# Patient Record
Sex: MALE | Race: WHITE | NOT HISPANIC OR LATINO | Employment: FULL TIME | URBAN - METROPOLITAN AREA
[De-identification: names, ages, dates, MRNs, and addresses within clinical notes are randomized per-mention and may not be internally consistent; named-entity substitution may affect disease eponyms.]

---

## 2024-08-02 ENCOUNTER — HOSPITAL ENCOUNTER (EMERGENCY)
Facility: HOSPITAL | Age: 49
Discharge: HOME | End: 2024-08-02
Attending: EMERGENCY MEDICINE
Payer: COMMERCIAL

## 2024-08-02 VITALS
SYSTOLIC BLOOD PRESSURE: 162 MMHG | HEART RATE: 93 BPM | WEIGHT: 181 LBS | RESPIRATION RATE: 16 BRPM | TEMPERATURE: 97.4 F | HEIGHT: 68 IN | OXYGEN SATURATION: 100 % | BODY MASS INDEX: 27.43 KG/M2 | DIASTOLIC BLOOD PRESSURE: 108 MMHG

## 2024-08-02 DIAGNOSIS — K60.2 RECTAL FISSURE: Primary | ICD-10-CM

## 2024-08-02 PROCEDURE — 99283 EMERGENCY DEPT VISIT LOW MDM: CPT

## 2024-08-02 RX ORDER — LIDOCAINE HYDROCHLORIDE 20 MG/ML
JELLY TOPICAL ONCE
Status: DISCONTINUED | OUTPATIENT
Start: 2024-08-02 | End: 2024-08-02 | Stop reason: SDUPTHER

## 2024-08-02 RX ORDER — LIDOCAINE HYDROCHLORIDE 20 MG/ML
JELLY TOPICAL ONCE
Status: COMPLETED | OUTPATIENT
Start: 2024-08-02 | End: 2024-08-02

## 2024-08-02 RX ADMIN — MINERAL OIL, PETROLATUM, PHENYLEPHRINE HCL 1 APPLICATION: 14; 74.9; .25 OINTMENT RECTAL at 22:50

## 2024-08-02 RX ADMIN — LIDOCAINE HYDROCHLORIDE: 20 JELLY TOPICAL at 22:50

## 2024-08-03 NOTE — ED PROVIDER NOTES
Subjective   History of Present Illness  Patient presents for evaluation of rectal plain and bleeding.  Patient states he is suffered from hemorrhoids and rectal fissures intermittently for few years.  He takes daily MiraLAX to help with constipation.  He was prescribed a suppository by primary nurse practitioner but his insurance did not cover it and it was $1500.  He states he is not having any rectal bleeding in between bowel movements.  He only notices blood on toilet paper when he wipes.  He is not having any fevers or chills.    History provided by:  Patient      Review of Systems    No past medical history on file.    No Known Allergies    No past surgical history on file.    No family history on file.    Social History     Socioeconomic History    Marital status:            Objective   Physical Exam  Constitutional:       General: He is not in acute distress.  HENT:      Head: Normocephalic and atraumatic.   Eyes:      Conjunctiva/sclera: Conjunctivae normal.      Pupils: Pupils are equal, round, and reactive to light.   Cardiovascular:      Rate and Rhythm: Normal rate and regular rhythm.      Pulses: Normal pulses.      Heart sounds: No murmur heard.     No gallop.   Pulmonary:      Effort: Pulmonary effort is normal. No respiratory distress.   Abdominal:      General: Abdomen is flat. There is no distension.      Tenderness: There is no abdominal tenderness.   Genitourinary:     Comments: No external hemorrhoids are noted.  There is a small rectal fissure.  Musculoskeletal:         General: No swelling or deformity. Normal range of motion.   Skin:     General: Skin is warm and dry.      Capillary Refill: Capillary refill takes less than 2 seconds.   Neurological:      General: No focal deficit present.      Mental Status: He is alert and oriented to person, place, and time.   Psychiatric:         Mood and Affect: Mood normal.         Behavior: Behavior normal.         Procedures           ED  "Course                                             Medical Decision Making  I believe most likely source of patient's pain and bleeding is small rectal fissure identified on examination, may also be internal hemorrhoids but patient declines internal examination at this time.  There is no evidence of any infectious etiology, specifically no erythema, induration, fluctuance.  Patient also reports he has had CT scans for similar episodes in the past and they have returned to normal.  He was given Preparation H and lidocaine jelly, recommended to use over-the-counter treatments, he already has an established follow-up with the primary doctor and a gastroenterologist.  He was discharged from the ER in good condition    Amount and/or Complexity of Data Reviewed  External Data Reviewed: notes.     Details: 3/29/2024 reviewed most recent primary visit where patient was treated for sinus infection    Risk  OTC drugs.  Prescription drug management.        Final diagnoses:   Rectal fissure       ED Disposition  ED Disposition       ED Disposition   Discharge    Condition   Stable    Comment   --           No results found for this or any previous visit (from the past 24 hour(s)).  Note: In addition to lab results from this visit, the labs listed above may include labs taken at another facility or during a different encounter within the last 24 hours. Please correlate lab times with ED admission and discharge times for further clarification of the services performed during this visit.    No orders to display     Vitals:    08/02/24 2125   BP: (!) 162/108   BP Location: Left arm   Patient Position: Sitting   Pulse: 83   Resp: 16   Temp: 97.4 °F (36.3 °C)   TempSrc: Oral   SpO2: 98%   Weight: 82.1 kg (181 lb)   Height: 172.7 cm (68\")     Medications   phenylephrine-mineral oil-petrolatum (PREPARATION H) 0.25-14-74.9 % hemorhoidal ointment (has no administration in time range)   Lidocaine HCl gel (XYLOCAINE) " urethral/mucosal/topical (has no administration in time range)     ECG/EMG Results (last 24 hours)       ** No results found for the last 24 hours. **          No orders to display           No follow-up provider specified.       Medication List      No changes were made to your prescriptions during this visit.            Rubén Hargrove MD  08/02/24 5800

## 2024-08-03 NOTE — DISCHARGE INSTRUCTIONS
Continue using MiraLAX to soften your stool.  Recommend over-the-counter medication Preparation H with lidocaine jelly to provide additional relief.  Follow-up with your primary doctor and gastroenterologist.  Return to the ER as needed for new or worsening symptoms